# Patient Record
Sex: FEMALE | URBAN - METROPOLITAN AREA
[De-identification: names, ages, dates, MRNs, and addresses within clinical notes are randomized per-mention and may not be internally consistent; named-entity substitution may affect disease eponyms.]

---

## 2019-10-13 ENCOUNTER — NURSE TRIAGE (OUTPATIENT)
Dept: NURSING | Facility: CLINIC | Age: 19
End: 2019-10-13

## 2019-10-14 NOTE — TELEPHONE ENCOUNTER
Silke calls and says that her left eye has bumps in the corner of the eye. Left eye is also foggy with white spots. Pt. Says that she also has discharge that she has to wipe away. Pt. Is not sure if she will see a Dr. Gutierrez.    Reason for Disposition    Child sounds very sick or weak to the triager    Additional Information    Negative: Sounds like a life-threatening emergency to the triager    Negative: [1] Redness of sclera (white of eye) AND [2] no pus    Negative: [1] History of blocked tear duct AND [2] not repaired    Negative: [1] Age < 12 weeks AND [2] fever 100.4 F (38.0 C) or higher rectally    Negative: [1] Age < 4 weeks AND [2] starts to look or act sick    Negative: [1] Fever AND [2] > 105 F (40.6 C) by any route OR axillary > 104 F (40 C)    Protocols used: EYE - PUS OR [id number]